# Patient Record
Sex: FEMALE | Race: BLACK OR AFRICAN AMERICAN | NOT HISPANIC OR LATINO
[De-identification: names, ages, dates, MRNs, and addresses within clinical notes are randomized per-mention and may not be internally consistent; named-entity substitution may affect disease eponyms.]

---

## 2023-01-10 ENCOUNTER — APPOINTMENT (OUTPATIENT)
Dept: CARDIOLOGY | Facility: CLINIC | Age: 38
End: 2023-01-10
Payer: COMMERCIAL

## 2023-01-10 ENCOUNTER — NON-APPOINTMENT (OUTPATIENT)
Age: 38
End: 2023-01-10

## 2023-01-10 VITALS
HEART RATE: 67 BPM | WEIGHT: 184 LBS | HEIGHT: 66.5 IN | BODY MASS INDEX: 29.22 KG/M2 | DIASTOLIC BLOOD PRESSURE: 80 MMHG | SYSTOLIC BLOOD PRESSURE: 131 MMHG | OXYGEN SATURATION: 98 % | TEMPERATURE: 99 F

## 2023-01-10 VITALS — DIASTOLIC BLOOD PRESSURE: 79 MMHG | SYSTOLIC BLOOD PRESSURE: 134 MMHG

## 2023-01-10 DIAGNOSIS — Z72.3 LACK OF PHYSICAL EXERCISE: ICD-10-CM

## 2023-01-10 DIAGNOSIS — Z83.438 FAMILY HISTORY OF OTHER DISORDER OF LIPOPROTEIN METABOLISM AND OTHER LIPIDEMIA: ICD-10-CM

## 2023-01-10 DIAGNOSIS — Z82.49 FAMILY HISTORY OF ISCHEMIC HEART DISEASE AND OTHER DISEASES OF THE CIRCULATORY SYSTEM: ICD-10-CM

## 2023-01-10 PROBLEM — Z00.00 ENCOUNTER FOR PREVENTIVE HEALTH EXAMINATION: Status: ACTIVE | Noted: 2023-01-10

## 2023-01-10 PROCEDURE — 99205 OFFICE O/P NEW HI 60 MIN: CPT

## 2023-01-10 PROCEDURE — 93306 TTE W/DOPPLER COMPLETE: CPT

## 2023-01-10 PROCEDURE — 93000 ELECTROCARDIOGRAM COMPLETE: CPT

## 2023-01-10 RX ORDER — ATENOLOL 50 MG/1
50 TABLET ORAL DAILY
Refills: 0 | Status: ACTIVE | COMMUNITY

## 2023-01-10 RX ORDER — HYDROCHLOROTHIAZIDE 25 MG/1
25 TABLET ORAL DAILY
Refills: 0 | Status: ACTIVE | COMMUNITY

## 2023-01-10 NOTE — PHYSICAL EXAM
[Well Nourished] : well nourished [No Acute Distress] : no acute distress [Normal Conjunctiva] : normal conjunctiva [Normal Venous Pressure] : normal venous pressure [No Carotid Bruit] : no carotid bruit [Normal S1, S2] : normal S1, S2 [No Murmur] : no murmur [No Rub] : no rub [No Gallop] : no gallop [Clear Lung Fields] : clear lung fields [Good Air Entry] : good air entry [No Respiratory Distress] : no respiratory distress  [Soft] : abdomen soft [Non Tender] : non-tender [No Masses/organomegaly] : no masses/organomegaly [Normal Bowel Sounds] : normal bowel sounds [Normal Gait] : normal gait [No Edema] : no edema [No Cyanosis] : no cyanosis [No Clubbing] : no clubbing [No Varicosities] : no varicosities [No Rash] : no rash [No Skin Lesions] : no skin lesions [Moves all extremities] : moves all extremities [No Focal Deficits] : no focal deficits [Normal Speech] : normal speech [Alert and Oriented] : alert and oriented [Normal memory] : normal memory

## 2023-01-10 NOTE — ASSESSMENT
[FreeTextEntry1] : HTN occasionally uncontrolled \par will continue atenolol 100 mg daily , enalapril 10 mg once daily and hctz 25 mg \par will not make changes today since atenolol was recently increase to 100 mg daily \par will order echocardiogram to evaluate for degree of LVH and EF . \par f/u in 1 month with NP with BP readings 3 times /week chart over the next month.

## 2023-01-10 NOTE — HISTORY OF PRESENT ILLNESS
[FreeTextEntry1] : 38 y/o female with PMHx of HTN diagnosed > 5 years ago, currently on atenolol 100 mg , HCTZ 25 mg and enalapril 10 mg once daily \par She has BP machine at home and occasionally find her BP uncontrolled (  ) \par Recently atenolol was doubled to 100 mg but patient sometimes takes 50 mg only if BP is controlled.\par She has no history of DM or hyperlipidemia \par no exertional chest pain or  SOB, palpitations,or syncope\par occasional dizziness when she stands up \par \par

## 2023-02-07 ENCOUNTER — OFFICE (OUTPATIENT)
Dept: URBAN - METROPOLITAN AREA CLINIC 94 | Facility: CLINIC | Age: 38
Setting detail: OPHTHALMOLOGY
End: 2023-02-07
Payer: COMMERCIAL

## 2023-02-07 DIAGNOSIS — H04.553: ICD-10-CM

## 2023-02-07 DIAGNOSIS — H02.821: ICD-10-CM

## 2023-02-07 DIAGNOSIS — H02.824: ICD-10-CM

## 2023-02-07 PROBLEM — H04.552 NASOLACRIMAL DUCT OBSTRUCTION ACQUIRED; RIGHT EYE, LEFT EYE, BOTH EYES: Status: ACTIVE | Noted: 2023-02-07

## 2023-02-07 PROBLEM — H04.551 NASOLACRIMAL DUCT OBSTRUCTION ACQUIRED; RIGHT EYE, LEFT EYE, BOTH EYES: Status: ACTIVE | Noted: 2023-02-07

## 2023-02-07 PROCEDURE — 68810 PROBE NASOLACRIMAL DUCT: CPT | Performed by: OPHTHALMOLOGY

## 2023-02-07 PROCEDURE — 92012 INTRM OPH EXAM EST PATIENT: CPT | Performed by: OPHTHALMOLOGY

## 2023-02-07 ASSESSMENT — AXIALLENGTH_DERIVED
OD_AL: 23.8323
OS_AL: 23.2721

## 2023-02-07 ASSESSMENT — VISUAL ACUITY
OS_BCVA: 20/20-
OD_BCVA: 20/20

## 2023-02-07 ASSESSMENT — SPHEQUIV_DERIVED
OS_SPHEQUIV: 0.125
OD_SPHEQUIV: -0.375

## 2023-02-07 ASSESSMENT — CONFRONTATIONAL VISUAL FIELD TEST (CVF)
OD_FINDINGS: FULL
OS_FINDINGS: FULL

## 2023-02-07 ASSESSMENT — TONOMETRY
OD_IOP_MMHG: 18
OS_IOP_MMHG: 20

## 2023-02-07 ASSESSMENT — REFRACTION_MANIFEST
OD_SPHERE: PLANO
OD_VA1: 20/20
OS_SPHERE: PLANO
OS_VA1: 20/20

## 2023-02-07 ASSESSMENT — REFRACTION_AUTOREFRACTION
OD_SPHERE: -0.25
OS_AXIS: 004
OS_SPHERE: +0.25
OD_AXIS: 030
OD_CYLINDER: -0.25
OS_CYLINDER: -0.25

## 2023-02-07 ASSESSMENT — KERATOMETRY
OD_K2POWER_DIOPTERS: 44.25
OD_K1POWER_DIOPTERS: 42.25
OD_AXISANGLE_DEGREES: 140
OS_AXISANGLE_DEGREES: 080
OS_K2POWER_DIOPTERS: 44.75
OS_K1POWER_DIOPTERS: 43.75

## 2023-02-07 ASSESSMENT — TUBE STENT
OD_TUBESTENT: RLL
OS_TUBESTENT: LLL

## 2023-04-23 ENCOUNTER — EMERGENCY (EMERGENCY)
Facility: HOSPITAL | Age: 38
LOS: 1 days | Discharge: HOME | End: 2023-04-23
Attending: STUDENT IN AN ORGANIZED HEALTH CARE EDUCATION/TRAINING PROGRAM
Payer: COMMERCIAL

## 2023-04-23 VITALS
TEMPERATURE: 92 F | HEIGHT: 67 IN | RESPIRATION RATE: 16 BRPM | SYSTOLIC BLOOD PRESSURE: 150 MMHG | OXYGEN SATURATION: 100 % | DIASTOLIC BLOOD PRESSURE: 91 MMHG | WEIGHT: 179.9 LBS | HEART RATE: 61 BPM

## 2023-04-23 PROCEDURE — 99284 EMERGENCY DEPT VISIT MOD MDM: CPT

## 2023-04-23 RX ORDER — IBUPROFEN 200 MG
600 TABLET ORAL ONCE
Refills: 0 | Status: COMPLETED | OUTPATIENT
Start: 2023-04-23 | End: 2023-04-23

## 2023-04-23 RX ORDER — DIAZEPAM 5 MG
1 TABLET ORAL
Qty: 9 | Refills: 0
Start: 2023-04-23 | End: 2023-04-25

## 2023-04-23 RX ORDER — GABAPENTIN 400 MG/1
1 CAPSULE ORAL
Qty: 12 | Refills: 0
Start: 2023-04-23 | End: 2023-04-26

## 2023-04-23 RX ORDER — IBUPROFEN 200 MG
1 TABLET ORAL
Qty: 20 | Refills: 0
Start: 2023-04-23 | End: 2023-04-27

## 2023-04-23 RX ORDER — DIAZEPAM 5 MG
2 TABLET ORAL ONCE
Refills: 0 | Status: DISCONTINUED | OUTPATIENT
Start: 2023-04-23 | End: 2023-04-23

## 2023-04-23 RX ORDER — GABAPENTIN 400 MG/1
400 CAPSULE ORAL ONCE
Refills: 0 | Status: COMPLETED | OUTPATIENT
Start: 2023-04-23 | End: 2023-04-23

## 2023-04-23 RX ADMIN — Medication 60 MILLIGRAM(S): at 14:39

## 2023-04-23 RX ADMIN — Medication 2 MILLIGRAM(S): at 14:38

## 2023-04-23 RX ADMIN — Medication 600 MILLIGRAM(S): at 14:39

## 2023-04-23 RX ADMIN — GABAPENTIN 400 MILLIGRAM(S): 400 CAPSULE ORAL at 14:38

## 2023-04-23 NOTE — ED PROVIDER NOTE - PATIENT PORTAL LINK FT
You can access the FollowMyHealth Patient Portal offered by [facility] by registering at the following website: [email or web address]. By joining [employer]’s FollowMyHealth portal, you will also be able to view your health information using other applications (apps) compatible with our system.

## 2023-04-23 NOTE — ED PROVIDER NOTE - NSFOLLOWUPCLINICS_GEN_ALL_ED_FT
[facility] - [facility]  Ortho/Spine  [address] [place] [address]  Phone: [phone]  Fax:

## 2023-04-23 NOTE — ED PROVIDER NOTE - OBJECTIVE STATEMENT
PT with no SPMHx presents to the ED with complaint of lower back pain x 3 wk. Pt states that she has had waxing and waning pain for the last 3 wk and then yesterday pain has gotten gradual worse and she has been having difficulty managing wit the pain. Pt states that pain is non radiating, made worse with activity improved by nothing, PT denies fever, chills, numbness tingling, loss of sensation saddle anesthesia, weakness, HE, loss of control of urine, or bowels IVDA.

## 2023-04-23 NOTE — ED PROVIDER NOTE - CLINICAL SUMMARY MEDICAL DECISION MAKING FREE TEXT BOX
PT with stable VS, no acute distress, non toxic appearing, tolerating PO in the ED, Pt neuro vasc intact, well appeaing, ambulating wo issue, Pt with no midline ttp, Pt cleared for dc home with trial of conservative tx, follow up to spinal center, educated about when to return to the ED if needed. PT verbalizes that he understands all instructions and results. Pt informed that ED is open and available 24/7 365 days a yr, encouraged to return to the ED if they have any change in condition, or feel the need for revaluation. PT with stable VS, no acute distress, non toxic appearing, tolerating PO in the ED, Pt neuro vasc intact, well appeaing, ambulating wo issue, Pt with no midline ttp, Pt cleared for dc home with trial of conservative tx, follow up to spinal center, educated about when to return to the ED if needed. PT verbalizes that he understands all instructions and results. Pt informed that ED is open and available 24/7 365 days a yr, encouraged to return to the ED if they have any change in condition, or feel the need for revaluation.    DEBBY Cardona: 38F back pain, no red flags on examination, ambulatory, will start symptomatic control, stable for outpatient follow up given o/w well and without any signs or symptoms of neurologic process.

## 2023-04-23 NOTE — ED PROVIDER NOTE - ATTENDING APP SHARED VISIT CONTRIBUTION OF CARE
DEBBY Cardona: see mdm    I have personally performed a face to face diagnostic evaluation on this patient.  I have reviewed the ACP note and agree with the history, exam, and plan of care, except as noted.   My medical decision making and observations are found above.

## 2023-04-23 NOTE — ED PROVIDER NOTE - ADDITIONAL NOTES AND INSTRUCTIONS:
PT was evaluated At [facility] ED and was found to have a condition that warranted time of to rest and heal from WORK/SCHOOL.   Melvin Kessler PA-C

## 2023-04-23 NOTE — ED ADULT TRIAGE NOTE - CHIEF COMPLAINT QUOTE
Back pain x 3 weeks. Patient states she was pulling something and started to feel pain afterwards. Patient was given pain medication at urgent care and states she has not been feeling relief.

## 2023-04-23 NOTE — ED PROVIDER NOTE - NSFOLLOWUPINSTRUCTIONS_ED_ALL_ED_FT
Patient education: Low back pain in adults (The Basics)  Written by the doctors and editors at [facility]  Please read the Disclaimer at the end of this page.    How worried should I be about low back pain?  Do not assume the worst. Almost everyone gets back pain at some point. Low back pain can be scary. But even when the pain is severe, it usually goes away on its own within a few weeks. The cases that require urgent care or surgery are rare.    See your doctor or nurse if you have back pain and you:    ?Recently had a fall or an injury to your back    ?Have numbness or weakness in your legs    ?Have problems with bladder or bowel control    ?Have unexplained weight loss    ?Have a fever or feel sick in other ways    ?Take steroid medicine, such as prednisone, on a regular basis    ?Have diabetes or a medical problem that weakens your immune system    ?Have a history of cancer or osteoporosis    You should also see a doctor if:    ?Your back pain is so severe that you cannot perform simple tasks    ?Your back pain does not start to improve within 4 weeks    What are the parts of the back?  The back is made up of (figure 1):    ?Vertebrae – A stack of bones that sit on top of one another like a stack of coins. Each of these bones has a hole in the center. When stacked, the holes in the bones form a hollow tube that protects the spinal cord.    ?Discs – Rubbery discs sit in between each of the vertebrae to add cushion and allow movement.    ?Spinal cord and nerves – The spinal cord is the highway of nerves that connects the brain to the rest of the body. It runs through the vertebrae within the spinal canal. Nerves branch from the spinal cord and pass in between the vertebrae. From there, they connect to the arms, the legs, and the rest of the body. This is why problems in the back can cause leg pain or bladder or bowel problems.    ?Muscles, tendons, and ligaments – Together, the muscles, tendons, and ligaments are called the "soft tissues" of the back. These soft tissues support the back and help hold it together.    What causes low back pain?  Many different things can cause low back pain. Most of the time, doctors do not know the exact cause.    Back pain can happen if you strain a muscle. This is often what has happened when a person "throws out" their back. This refers to pain that starts suddenly after physical activity, like lifting something heavy or bending over.    Back pain can also happen if you have:    ?Damaged, bulging, or torn discs    ?Arthritis affecting the joints of the spine    ?Bony growths on the vertebrae that crowd nearby nerves    ?A vertebra out of place    ?Narrowing in the spinal canal    ?A tumor or infection (but this is very rare)    Should I get an imaging test?  Most people do not need an imaging test such an X-ray, CT scan, or MRI. Most cases of back pain go away a few weeks. Doctors usually do not order imaging tests unless there are signs of something unusual.    If your doctor does not order an imaging test, do not worry. They can still learn a lot about your pain just from looking you over and talking with you.    How can the doctor or nurse tell what is wrong just by talking to me?  Your symptoms tell your doctor or nurse a lot about the cause of your pain. For example:    ?If your pain started after you did something specific, like lifting a heavy object or twisting your back, you might have strained a muscle    ?If your pain spreads down the back of 1 thigh, it could be a sign that 1 of the nerves that go to your leg is being pinched by a bulging or torn disc    ?If your pain goes all the way down both legs, it could be a sign that you have a narrowed spinal canal. This is most often due to bony growths on your spine.    How is back pain treated?  Most people with an episode of low back pain do not have a serious medical problem, and can try simple treatments such as:    ?Staying active – The best thing you can do is to stay as active as possible. People with low back pain recover faster if they stay active. If your pain is severe, you might need to rest for a day or 2. But it's important to get back to walking and moving as soon as possible. While you should avoid heavy lifting and sports while your back hurts, try to keep doing your normal daily activities.    ?Heat – Some people find that it helps to use a heating pad or heated wrap. Be careful to avoid high heat settings to prevent skin burns.    ?Medicines – First, you can try pain medicines that you can get without a prescription. In many cases, doctors suggest first trying a nonsteroidal antiinflammatory drug, or "NSAID." NSAIDs include ibuprofen (sample brand names: Advil, Motrin) and naproxen (sample brand name: Aleve). These might work better than acetaminophen (sample brand name: Tylenol) for back pain.    If non-prescription medicines do not help, let your doctor or nurse know. In some cases, doctors prescribe a medicine to relax the muscles (called a "muscle relaxant"). But keep in mind that muscle relaxants are not generally used in people older than 65. In older people, these medicines can cause side effects such as trouble urinating or confusion.    ?Treatments to help with symptoms – Some treatments might help you feel better for a little while. They include:    •Spinal manipulation – This is when a chiropractor, physical therapist, or other professional moves or "adjusts" the joints of your back. If you want to try this, talk to your doctor or nurse first.    •Acupuncture – This is when someone who knows traditional Chinese medicine inserts tiny needles into your body to block pain signals.    •Massage – A massage therapist massages the muscles and other soft tissues in your back.    While back pain usually goes away within a few weeks, some people do continue to have pain for longer. In this case, additional treatments might include:    ?Self-care – This involves being aware of your pain. While you should rest when you need to, it's important to stay active as much as you can. Things like applying heat and doing gentle stretches can help you feel better, too.    ?Physical therapy – A physical therapist is an exercise expert who can teach you stretches and movements to help strengthen your muscles. The goal is to relieve pain but also help you get back to your normal activities.    Exercises you can try include walking, swimming, or using an exercise bike. Some people also find that evan chi or yoga can help with their back pain. Finding activities you enjoy can help you stay active.    ?Reducing stress – Some people find that it helps to try something called "mindfulness-based stress reduction." This involves going to a group program to practice relaxation and meditation. If your back pain is making you feel anxious or depressed, talk to your doctor or nurse. There are other treatments that can help with these problems.    Some people wonder if injections (shots) can help to relieve back pain. In some cases, doctors might recommend a shot of medicine to numb the area or reduce swelling. But this has only been proven to work in specific situations.    Only a small number of people will need surgery to treat back pain.    What can I do to keep from getting back pain again?  The best thing you can do is to stay active. Doing exercises to strengthen and stretch your back can help. You can also:    ?Learn to lift using your legs instead of your back    ?Avoid sitting or standing in the same position for too long    Having back pain can be frustrating and scary. But it can help to know that doing these things can lower your risk of having another episode.

## 2023-04-25 PROBLEM — Z78.9 OTHER SPECIFIED HEALTH STATUS: Chronic | Status: ACTIVE | Noted: 2023-04-23

## 2023-05-22 ENCOUNTER — NON-APPOINTMENT (OUTPATIENT)
Age: 38
End: 2023-05-22

## 2023-05-22 ENCOUNTER — APPOINTMENT (OUTPATIENT)
Dept: CARDIOLOGY | Facility: CLINIC | Age: 38
End: 2023-05-22
Payer: COMMERCIAL

## 2023-05-22 VITALS
HEART RATE: 61 BPM | OXYGEN SATURATION: 97 % | WEIGHT: 182 LBS | TEMPERATURE: 98.3 F | BODY MASS INDEX: 28.9 KG/M2 | SYSTOLIC BLOOD PRESSURE: 130 MMHG | DIASTOLIC BLOOD PRESSURE: 71 MMHG | HEIGHT: 66.5 IN

## 2023-05-22 PROCEDURE — 99214 OFFICE O/P EST MOD 30 MIN: CPT

## 2023-05-22 PROCEDURE — 93000 ELECTROCARDIOGRAM COMPLETE: CPT

## 2023-05-22 RX ORDER — ENALAPRIL MALEATE 10 MG/1
10 TABLET ORAL DAILY
Refills: 0 | Status: DISCONTINUED | COMMUNITY
End: 2023-05-22

## 2023-05-25 ENCOUNTER — APPOINTMENT (OUTPATIENT)
Age: 38
End: 2023-05-25

## 2023-05-30 ENCOUNTER — APPOINTMENT (OUTPATIENT)
Dept: ORTHOPEDIC SURGERY | Facility: CLINIC | Age: 38
End: 2023-05-30
Payer: COMMERCIAL

## 2023-05-30 ENCOUNTER — NON-APPOINTMENT (OUTPATIENT)
Age: 38
End: 2023-05-30

## 2023-05-30 VITALS
BODY MASS INDEX: 28.56 KG/M2 | TEMPERATURE: 98.1 F | HEIGHT: 67 IN | WEIGHT: 182 LBS | DIASTOLIC BLOOD PRESSURE: 88 MMHG | HEART RATE: 67 BPM | SYSTOLIC BLOOD PRESSURE: 131 MMHG

## 2023-05-30 DIAGNOSIS — M47.816 SPONDYLOSIS W/OUT MYELOPATHY OR RADICULOPATHY, LUMBAR REGION: ICD-10-CM

## 2023-05-30 DIAGNOSIS — M54.16 RADICULOPATHY, LUMBAR REGION: ICD-10-CM

## 2023-05-30 PROCEDURE — 72100 X-RAY EXAM L-S SPINE 2/3 VWS: CPT

## 2023-05-30 PROCEDURE — 99204 OFFICE O/P NEW MOD 45 MIN: CPT

## 2023-05-30 NOTE — PHYSICAL EXAM
[UE/LE] : Sensory: Intact in bilateral upper & lower extremities [ALL] : dorsalis pedis, posterior tibial, femoral, popliteal, and radial 2+ and symmetric bilaterally [Normal Finger/nose] : finger to nose coordination [Normal] : Oriented to person, place, and time, insight and judgement were intact and the affect was normal [Poor Appearance] : well-appearing [Acute Distress] : not in acute distress [de-identified] : Gait Steady \par \par Spine \par No bony tenderness, no step-offs, \par \par Cervical  ROM \par Flexion 50 degrees\par Extension 60 Degrees \par Rotation 80 degrees\par Lateral bend 45 degrees\par \par Spurlings Test Negative\par \par Upper Extremities - Reflexes 2 +, \par Strength\par Shoulder Abduction - 5/5\par Elbow Flexion 5/5\par Elbow Extension 5/5\par Wrist Flexion 5/5\par Wrist Extension 5/5\par  strength 5/5\par Finger Abduction 5/5\par Rubio's test negative \par Sensation intact and equal to light touch bilaterally\par Distal pulses intact \par \par Lumbar ROM\par Flexion 60 degrees\par Extension 25 degrees\par Lateral bend 25 degrees \par Positive tenderness to the right-sided lower lumbar paraspinal musculature\par Lower Extremities Reflexes 2 +, \par Strength \par Hip flexion 5/5\par Knee Extension 5/5\par Dorsi/Plantar flexion 5/5\par EHL 5/5\par Clonus Negative \par Babinski Negative \par SLR -positive on the right\par Sensation intact and equal to light touch bilaterally\par Distal Pulses intact\par \par  [de-identified] : BOAZR [de-identified] : 2 xray views of the lumbar spine were obtained.\par \par -No fractures or dislocations\par -Normal appearing lumbar lordosis

## 2023-05-30 NOTE — ASSESSMENT
[FreeTextEntry1] : Patient presents with lower back pain. Their symptoms are consistent with right-sided paraspinal lumbar muscle spasm with radicular symptoms. The nature of this condition was described at length with the patient and they verbalized understanding. \par \par Recommendations: \par -Physical therapy\par -Continue medications prescribed for her from other physicians, being particularly careful taking the gabapentin during the day\par -Medrol Dosepak (Rx sent)\par -Followup with Dr. Mora as needed\par -Patient was given ample time to ask questions and all questions were answered to the patient's full satisfaction.\par \par The patient was in full agreement with this plan and appreciative of their care.\par

## 2023-05-30 NOTE — HISTORY OF PRESENT ILLNESS
[de-identified] : 05/30/2023: Patient is a 38 year-old female who presents to the office today for initial evaluation of lower back pain. Insidious onset of pain in the absence of any injury or inciting event. She works as a CNA at [employer]. She notes that the pain has been rather constant since March. It is located in the right lower back and it radiates to the left side and down the anterior thighs bilaterally. She notes there is a lot of muscle spasms. Over the last 2-3 months, she has been to her PCP, Urgent Care and the [facility] ED with some relief with medications prescribed for her. She was prescribed Ibuprofen, Gabapentin and Methocarbamol which do give relief, but the pain returns when she discontinues the medications. She does endorse periodic issues where holding her urine in is difficult, but this is not often. She denies any bowel dysfunction. She denies weakness or paresthesias of the lower extremities. Pain was giving her a limp. She has only tried physician directed medications, but no PT, acupuncture, or chiropractic\par \par Upon detailed questioning of the patient, they deny any complaints of fever, chills, sweats, nausea or vomiting, bowel dysfunction, recent weight loss or gain, or night pain. The above history is in addition to the intake form, completed by the patient, that I personally reviewed and discussed with her at length during this visit. There is no correlation identified between their presenting symptoms and their family, social and past medical history.\par \par

## 2023-06-02 PROBLEM — M47.816 LUMBAR SPONDYLOSIS: Status: ACTIVE | Noted: 2023-05-24

## 2023-07-13 ENCOUNTER — APPOINTMENT (OUTPATIENT)
Dept: ORTHOPEDIC SURGERY | Facility: CLINIC | Age: 38
End: 2023-07-13
Payer: COMMERCIAL

## 2023-07-13 VITALS
WEIGHT: 180 LBS | BODY MASS INDEX: 28.25 KG/M2 | DIASTOLIC BLOOD PRESSURE: 79 MMHG | HEIGHT: 67 IN | SYSTOLIC BLOOD PRESSURE: 116 MMHG | HEART RATE: 80 BPM

## 2023-07-13 PROCEDURE — 99213 OFFICE O/P EST LOW 20 MIN: CPT

## 2023-07-13 NOTE — HISTORY OF PRESENT ILLNESS
[de-identified] : Pleasant 38-year-old female with essentially acute on chronic low back pain she was seen by the walk-in service here at [facility] given an anti-inflammatory steroid as well as a course of physical therapy she has not yet enrolled in physical therapy.  No radicular complaints [Stable] : stable [5] : a current pain level of 5/10 [10] : a maximum pain level of 10/10 [Intermit.] : ~He/She~ states the symptoms seem to be intermittent [Bending] : worsened by bending [Lifting] : worsened by lifting [NSAIDs] : relieved by nonsteroidal anti-inflammatory drugs [Rest] : relieved by rest [Ataxia] : no ataxia [Incontinence] : no incontinence [Loss of Dexterity] : good dexterity [Urinary Ret.] : no urinary retention

## 2023-07-13 NOTE — DISCUSSION/SUMMARY
[de-identified] : 38-year-old female with chronic low back pain isolated in the right paraspinal musculature patient was offered a trigger point she declined she is starting physical therapy this month patient can follow-up in 6 to 8 weeks or on a as needed basis after physical therapy is complete to become eligible for lumbar MRI she is also provided naproxen sodium

## 2023-07-13 NOTE — PHYSICAL EXAM
[Antalgic] : antalgic [de-identified] : CONSTITUTIONAL: The patient is a very pleasant individual who is well-nourished and who appears stated age.\par PSYCHIATRIC: The patient is alert and oriented X 3 and in no apparent distress, and participates with orthopedic evaluation well.\par HEAD: Atraumatic and is nonsyndromic in appearance.\par EENT: No visible thyromegaly, EOMI.\par RESPIRATORY: Respiratory rate is regular, not dyspneic on examination.\par LYMPHATICS: There is no inguinal lymphadenopathy\par INTEGUMENTARY: Skin is clean, dry, and intact about the bilateral lower extremities and lumbar spine.\par VASCULAR: There is brisk capillary refill about the bilateral lower extremities.\par NEUROLOGIC: There are no pathologic reflexes. There is no decrease in sensation of the bilateral lower extremities on Wartenberg pinwheel examination. Deep tendon reflexes are well maintained at 2+/4 of the bilateral lower extremities and are symmetric..\par MUSCULOSKELETAL: There is no visible muscular atrophy. Manual motor strength is well maintained in the bilateral lower extremities. Range of motion of lumbar spine is well maintained. The patient ambulates in a non-myelopathic manner. Negative tension sign and straight leg raise bilaterally. Quad extension, ankle dorsiflexion, EHL, plantar flexion, and ankle eversion are well preserved. Normal secondary orthopaedic exam of bilateral hips, greater trochanteric area, knees and ankles, mechanically orientated low back pain I believe consistent with more of a muscular/myositis type presentation focused on the right lower paraspinal musculature [de-identified] : Previous lumbar x-rays have been reviewed age-appropriate no acute osseous abnormalities no significant degenerative changes.  These are from May 2023

## 2023-09-05 ENCOUNTER — APPOINTMENT (OUTPATIENT)
Dept: ORTHOPEDIC SURGERY | Facility: CLINIC | Age: 38
End: 2023-09-05

## 2023-10-03 ENCOUNTER — APPOINTMENT (OUTPATIENT)
Dept: ORTHOPEDIC SURGERY | Facility: CLINIC | Age: 38
End: 2023-10-03
Payer: COMMERCIAL

## 2023-10-03 VITALS
DIASTOLIC BLOOD PRESSURE: 78 MMHG | BODY MASS INDEX: 28.25 KG/M2 | HEIGHT: 67 IN | SYSTOLIC BLOOD PRESSURE: 117 MMHG | HEART RATE: 75 BPM | WEIGHT: 180 LBS

## 2023-10-03 DIAGNOSIS — M54.50 LOW BACK PAIN, UNSPECIFIED: ICD-10-CM

## 2023-10-03 DIAGNOSIS — M62.830 MUSCLE SPASM OF BACK: ICD-10-CM

## 2023-10-03 PROCEDURE — 99213 OFFICE O/P EST LOW 20 MIN: CPT

## 2023-11-30 ENCOUNTER — NON-APPOINTMENT (OUTPATIENT)
Age: 38
End: 2023-11-30

## 2023-11-30 ENCOUNTER — APPOINTMENT (OUTPATIENT)
Dept: CARDIOLOGY | Facility: CLINIC | Age: 38
End: 2023-11-30
Payer: COMMERCIAL

## 2023-11-30 VITALS
WEIGHT: 185.19 LBS | HEART RATE: 80 BPM | BODY MASS INDEX: 29.07 KG/M2 | HEIGHT: 67 IN | DIASTOLIC BLOOD PRESSURE: 78 MMHG | SYSTOLIC BLOOD PRESSURE: 124 MMHG | OXYGEN SATURATION: 98 %

## 2023-11-30 DIAGNOSIS — I10 ESSENTIAL (PRIMARY) HYPERTENSION: ICD-10-CM

## 2023-11-30 PROCEDURE — 99214 OFFICE O/P EST MOD 30 MIN: CPT

## 2023-11-30 PROCEDURE — 93000 ELECTROCARDIOGRAM COMPLETE: CPT

## 2023-11-30 RX ORDER — METHYLPREDNISOLONE 4 MG/1
4 TABLET ORAL
Qty: 1 | Refills: 1 | Status: DISCONTINUED | COMMUNITY
Start: 2023-05-30 | End: 2023-11-30

## 2023-11-30 RX ORDER — NAPROXEN 500 MG/1
500 TABLET ORAL
Qty: 60 | Refills: 1 | Status: DISCONTINUED | COMMUNITY
Start: 2023-07-13 | End: 2023-11-30

## 2024-04-30 ENCOUNTER — RX RENEWAL (OUTPATIENT)
Age: 39
End: 2024-04-30

## 2024-04-30 RX ORDER — NIFEDIPINE 60 MG/1
60 TABLET, EXTENDED RELEASE ORAL DAILY
Qty: 90 | Refills: 2 | Status: ACTIVE | COMMUNITY
Start: 2023-05-22 | End: 1900-01-01

## 2024-07-11 ENCOUNTER — NON-APPOINTMENT (OUTPATIENT)
Age: 39
End: 2024-07-11

## 2024-07-11 ENCOUNTER — APPOINTMENT (OUTPATIENT)
Dept: CARDIOLOGY | Facility: CLINIC | Age: 39
End: 2024-07-11
Payer: COMMERCIAL

## 2024-07-11 VITALS
SYSTOLIC BLOOD PRESSURE: 121 MMHG | WEIGHT: 178 LBS | DIASTOLIC BLOOD PRESSURE: 75 MMHG | HEART RATE: 80 BPM | HEIGHT: 67 IN | OXYGEN SATURATION: 100 % | BODY MASS INDEX: 27.94 KG/M2

## 2024-07-11 DIAGNOSIS — I1A.0 RESISTANT HYPERTENSION: ICD-10-CM

## 2024-07-11 PROCEDURE — 93000 ELECTROCARDIOGRAM COMPLETE: CPT

## 2024-07-11 PROCEDURE — 99214 OFFICE O/P EST MOD 30 MIN: CPT

## 2024-07-11 PROCEDURE — G2211 COMPLEX E/M VISIT ADD ON: CPT

## 2024-07-30 ENCOUNTER — APPOINTMENT (OUTPATIENT)
Dept: CARDIOLOGY | Facility: CLINIC | Age: 39
End: 2024-07-30
Payer: COMMERCIAL

## 2024-07-30 PROCEDURE — 93975 VASCULAR STUDY: CPT

## 2024-07-30 PROCEDURE — 76775 US EXAM ABDO BACK WALL LIM: CPT | Mod: 59

## 2025-06-16 ENCOUNTER — APPOINTMENT (OUTPATIENT)
Dept: CARDIOLOGY | Facility: CLINIC | Age: 40
End: 2025-06-16